# Patient Record
Sex: MALE | Race: WHITE | NOT HISPANIC OR LATINO | Employment: OTHER | URBAN - NONMETROPOLITAN AREA
[De-identification: names, ages, dates, MRNs, and addresses within clinical notes are randomized per-mention and may not be internally consistent; named-entity substitution may affect disease eponyms.]

---

## 2023-04-21 DIAGNOSIS — F41.8 OTHER SPECIFIED ANXIETY DISORDERS: ICD-10-CM

## 2023-04-22 RX ORDER — TRAZODONE HYDROCHLORIDE 50 MG/1
TABLET ORAL
Qty: 30 TABLET | Refills: 2 | Status: SHIPPED | OUTPATIENT
Start: 2023-04-22 | End: 2023-07-24

## 2023-06-16 DIAGNOSIS — F41.8 OTHER SPECIFIED ANXIETY DISORDERS: ICD-10-CM

## 2023-06-16 RX ORDER — SERTRALINE HYDROCHLORIDE 100 MG/1
TABLET, FILM COATED ORAL
Qty: 90 TABLET | Refills: 0 | Status: SHIPPED | OUTPATIENT
Start: 2023-06-16 | End: 2023-09-23

## 2023-07-20 DIAGNOSIS — F41.8 OTHER SPECIFIED ANXIETY DISORDERS: ICD-10-CM

## 2023-07-24 RX ORDER — TRAZODONE HYDROCHLORIDE 50 MG/1
TABLET ORAL
Qty: 30 TABLET | Refills: 0 | Status: SHIPPED | OUTPATIENT
Start: 2023-07-24 | End: 2023-08-24

## 2023-08-10 ENCOUNTER — TELEPHONE (OUTPATIENT)
Dept: PRIMARY CARE | Facility: CLINIC | Age: 73
End: 2023-08-10
Payer: MEDICARE

## 2023-08-23 ENCOUNTER — APPOINTMENT (OUTPATIENT)
Dept: PRIMARY CARE | Facility: CLINIC | Age: 73
End: 2023-08-23
Payer: MEDICARE

## 2023-08-23 DIAGNOSIS — F41.8 OTHER SPECIFIED ANXIETY DISORDERS: ICD-10-CM

## 2023-08-24 RX ORDER — TRAZODONE HYDROCHLORIDE 50 MG/1
50 TABLET ORAL NIGHTLY
Qty: 30 TABLET | Refills: 0 | OUTPATIENT
Start: 2023-08-24

## 2023-08-24 RX ORDER — TRAZODONE HYDROCHLORIDE 50 MG/1
TABLET ORAL
Qty: 30 TABLET | Refills: 0 | Status: SHIPPED | OUTPATIENT
Start: 2023-08-24 | End: 2023-09-23

## 2023-08-29 ENCOUNTER — OFFICE VISIT (OUTPATIENT)
Dept: PRIMARY CARE | Facility: CLINIC | Age: 73
End: 2023-08-29
Payer: MEDICARE

## 2023-08-29 VITALS
WEIGHT: 135 LBS | SYSTOLIC BLOOD PRESSURE: 120 MMHG | DIASTOLIC BLOOD PRESSURE: 72 MMHG | BODY MASS INDEX: 20.46 KG/M2 | HEIGHT: 68 IN | HEART RATE: 84 BPM | OXYGEN SATURATION: 96 % | TEMPERATURE: 97 F

## 2023-08-29 DIAGNOSIS — Z00.00 ROUTINE GENERAL MEDICAL EXAMINATION AT HEALTH CARE FACILITY: Primary | ICD-10-CM

## 2023-08-29 PROCEDURE — G0439 PPPS, SUBSEQ VISIT: HCPCS | Performed by: FAMILY MEDICINE

## 2023-08-29 PROCEDURE — 1170F FXNL STATUS ASSESSED: CPT | Performed by: FAMILY MEDICINE

## 2023-08-29 PROCEDURE — G0008 ADMIN INFLUENZA VIRUS VAC: HCPCS | Performed by: FAMILY MEDICINE

## 2023-08-29 PROCEDURE — 1126F AMNT PAIN NOTED NONE PRSNT: CPT | Performed by: FAMILY MEDICINE

## 2023-08-29 PROCEDURE — 1159F MED LIST DOCD IN RCRD: CPT | Performed by: FAMILY MEDICINE

## 2023-08-29 PROCEDURE — 90662 IIV NO PRSV INCREASED AG IM: CPT | Performed by: FAMILY MEDICINE

## 2023-08-29 RX ORDER — NAPROXEN SODIUM 220 MG/1
1 TABLET, FILM COATED ORAL DAILY
COMMUNITY
Start: 2019-08-23

## 2023-08-29 RX ORDER — AZATHIOPRINE 50 MG/1
50 TABLET ORAL 2 TIMES DAILY
COMMUNITY

## 2023-08-29 RX ORDER — METOPROLOL SUCCINATE 25 MG/1
25 TABLET, EXTENDED RELEASE ORAL DAILY
COMMUNITY
End: 2024-03-14 | Stop reason: SDUPTHER

## 2023-08-29 RX ORDER — ATORVASTATIN CALCIUM 40 MG/1
40 TABLET, FILM COATED ORAL DAILY
COMMUNITY
End: 2024-04-23 | Stop reason: SDUPTHER

## 2023-08-29 RX ORDER — AMLODIPINE BESYLATE 10 MG/1
10 TABLET ORAL DAILY
COMMUNITY
End: 2023-12-18 | Stop reason: SDUPTHER

## 2023-08-29 ASSESSMENT — ENCOUNTER SYMPTOMS
SORE THROAT: 0
ARTHRALGIAS: 0
CONSTIPATION: 0
SLEEP DISTURBANCE: 0
VOMITING: 0
DYSPHORIC MOOD: 0
DIZZINESS: 0
NUMBNESS: 0
LIGHT-HEADEDNESS: 0
EYE ITCHING: 0
DIARRHEA: 0
UNEXPECTED WEIGHT CHANGE: 0
RHINORRHEA: 0
DEPRESSION: 0
LOSS OF SENSATION IN FEET: 0
FEVER: 0
FLANK PAIN: 0
ROS GI COMMENTS: SEE HISTORY
PALPITATIONS: 0
NERVOUS/ANXIOUS: 0
DYSURIA: 0
APPETITE CHANGE: 0
WHEEZING: 0
COUGH: 0
HEMATURIA: 0
NAUSEA: 0
ACTIVITY CHANGE: 0
BLOOD IN STOOL: 0
SINUS PRESSURE: 0
WEAKNESS: 0
ABDOMINAL PAIN: 0
HEADACHES: 0
SHORTNESS OF BREATH: 0
EYE DISCHARGE: 0
OCCASIONAL FEELINGS OF UNSTEADINESS: 0
MYALGIAS: 0
JOINT SWELLING: 0

## 2023-08-29 ASSESSMENT — ACTIVITIES OF DAILY LIVING (ADL)
BATHING: INDEPENDENT
DRESSING: INDEPENDENT
MANAGING_FINANCES: INDEPENDENT
GROCERY_SHOPPING: INDEPENDENT
TAKING_MEDICATION: INDEPENDENT
DOING_HOUSEWORK: INDEPENDENT

## 2023-08-29 ASSESSMENT — PATIENT HEALTH QUESTIONNAIRE - PHQ9
2. FEELING DOWN, DEPRESSED OR HOPELESS: NOT AT ALL
SUM OF ALL RESPONSES TO PHQ9 QUESTIONS 1 AND 2: 0
1. LITTLE INTEREST OR PLEASURE IN DOING THINGS: NOT AT ALL

## 2023-08-29 NOTE — PROGRESS NOTES
"Subjective   Reason for Visit: Eliot Sandoval is an 72 y.o. male here for a Medicare Wellness visit.     Past Medical, Surgical, and Family History reviewed and updated in chart.    Reviewed all medications by prescribing practitioner or clinical pharmacist (such as prescriptions, OTCs, herbal therapies and supplements) and documented in the medical record.    HPIpatient follows with Alan in cardiology and Milton in gi for his chronic pancreatitis     Patient Care Team:  Marichuy Mccullough DO as PCP - General     Review of Systems   Constitutional:  Negative for activity change, appetite change, fever and unexpected weight change.   HENT:  Negative for congestion, ear pain, postnasal drip, rhinorrhea, sinus pressure and sore throat.    Eyes:  Negative for discharge, itching and visual disturbance.   Respiratory:  Negative for cough, shortness of breath and wheezing.    Cardiovascular:  Negative for chest pain, palpitations and leg swelling.   Gastrointestinal:  Negative for abdominal pain, blood in stool, constipation, diarrhea, nausea and vomiting.        See history    Endocrine: Negative for cold intolerance, heat intolerance and polyuria.   Genitourinary:  Negative for dysuria, flank pain and hematuria.   Musculoskeletal:  Negative for arthralgias, gait problem, joint swelling and myalgias.   Skin:  Negative for rash.   Allergic/Immunologic: Negative for environmental allergies and food allergies.   Neurological:  Negative for dizziness, syncope, weakness, light-headedness, numbness and headaches.   Psychiatric/Behavioral:  Negative for dysphoric mood and sleep disturbance. The patient is not nervous/anxious.        Objective   Vitals:  /72   Pulse 84   Temp 36.1 °C (97 °F)   Ht 1.727 m (5' 8\")   Wt 61.2 kg (135 lb)   SpO2 96%   BMI 20.53 kg/m²       Physical Exam  Vitals and nursing note reviewed.   Constitutional:       Appearance: Normal appearance.   HENT:      Head: Normocephalic.      " Mouth/Throat:      Mouth: Mucous membranes are moist.   Cardiovascular:      Rate and Rhythm: Normal rate and regular rhythm.      Pulses: Normal pulses.      Heart sounds: Normal heart sounds. No murmur heard.     No friction rub. No gallop.   Pulmonary:      Effort: Pulmonary effort is normal. No respiratory distress.      Breath sounds: Normal breath sounds. No wheezing.   Abdominal:      General: Bowel sounds are normal. There is no distension.      Palpations: Abdomen is soft.      Tenderness: There is no abdominal tenderness.   Musculoskeletal:         General: No deformity. Normal range of motion.   Skin:     General: Skin is warm and dry.      Capillary Refill: Capillary refill takes less than 2 seconds.   Neurological:      General: No focal deficit present.      Mental Status: He is alert and oriented to person, place, and time.   Psychiatric:         Mood and Affect: Mood normal.         Assessment/Plan   Problem List Items Addressed This Visit    None  Visit Diagnoses       Routine general medical examination at health care facility    -  Primary

## 2023-09-21 DIAGNOSIS — F41.8 OTHER SPECIFIED ANXIETY DISORDERS: ICD-10-CM

## 2023-09-23 RX ORDER — SERTRALINE HYDROCHLORIDE 100 MG/1
TABLET, FILM COATED ORAL
Qty: 90 TABLET | Refills: 1 | Status: SHIPPED | OUTPATIENT
Start: 2023-09-23 | End: 2024-03-22

## 2023-09-23 RX ORDER — TRAZODONE HYDROCHLORIDE 50 MG/1
TABLET ORAL
Qty: 30 TABLET | Refills: 5 | Status: SHIPPED | OUTPATIENT
Start: 2023-09-23 | End: 2024-03-22

## 2023-12-18 DIAGNOSIS — I10 PRIMARY HYPERTENSION: Primary | ICD-10-CM

## 2023-12-19 RX ORDER — AMLODIPINE BESYLATE 10 MG/1
10 TABLET ORAL DAILY
Qty: 30 TABLET | Refills: 0 | Status: SHIPPED | OUTPATIENT
Start: 2023-12-19 | End: 2024-03-18

## 2024-03-14 DIAGNOSIS — I10 PRIMARY HYPERTENSION: Primary | ICD-10-CM

## 2024-03-15 RX ORDER — METOPROLOL SUCCINATE 25 MG/1
25 TABLET, EXTENDED RELEASE ORAL DAILY
Qty: 30 TABLET | Refills: 2 | Status: SHIPPED | OUTPATIENT
Start: 2024-03-15 | End: 2024-04-23 | Stop reason: SDUPTHER

## 2024-03-18 DIAGNOSIS — I10 PRIMARY HYPERTENSION: ICD-10-CM

## 2024-03-18 RX ORDER — AMLODIPINE BESYLATE 10 MG/1
10 TABLET ORAL DAILY
Qty: 30 TABLET | Refills: 0 | Status: SHIPPED | OUTPATIENT
Start: 2024-03-18 | End: 2024-04-23 | Stop reason: SDUPTHER

## 2024-03-21 DIAGNOSIS — F41.8 OTHER SPECIFIED ANXIETY DISORDERS: ICD-10-CM

## 2024-03-22 RX ORDER — TRAZODONE HYDROCHLORIDE 50 MG/1
TABLET ORAL
Qty: 30 TABLET | Refills: 1 | Status: SHIPPED | OUTPATIENT
Start: 2024-03-22 | End: 2024-05-22

## 2024-03-22 RX ORDER — SERTRALINE HYDROCHLORIDE 100 MG/1
TABLET, FILM COATED ORAL
Qty: 90 TABLET | Refills: 1 | Status: SHIPPED | OUTPATIENT
Start: 2024-03-22

## 2024-04-23 ENCOUNTER — OFFICE VISIT (OUTPATIENT)
Dept: CARDIOLOGY | Facility: CLINIC | Age: 74
End: 2024-04-23
Payer: MEDICARE

## 2024-04-23 VITALS
WEIGHT: 138 LBS | SYSTOLIC BLOOD PRESSURE: 164 MMHG | OXYGEN SATURATION: 97 % | DIASTOLIC BLOOD PRESSURE: 83 MMHG | HEIGHT: 69 IN | HEART RATE: 75 BPM | BODY MASS INDEX: 20.44 KG/M2

## 2024-04-23 DIAGNOSIS — I73.9 INTERMITTENT CLAUDICATION (CMS-HCC): ICD-10-CM

## 2024-04-23 DIAGNOSIS — I10 PRIMARY HYPERTENSION: ICD-10-CM

## 2024-04-23 DIAGNOSIS — E78.2 MIXED HYPERLIPIDEMIA: Primary | ICD-10-CM

## 2024-04-23 DIAGNOSIS — I73.9 PAD (PERIPHERAL ARTERY DISEASE) (CMS-HCC): ICD-10-CM

## 2024-04-23 PROCEDURE — 1159F MED LIST DOCD IN RCRD: CPT | Performed by: NURSE PRACTITIONER

## 2024-04-23 PROCEDURE — 3077F SYST BP >= 140 MM HG: CPT | Performed by: NURSE PRACTITIONER

## 2024-04-23 PROCEDURE — 3079F DIAST BP 80-89 MM HG: CPT | Performed by: NURSE PRACTITIONER

## 2024-04-23 PROCEDURE — 1160F RVW MEDS BY RX/DR IN RCRD: CPT | Performed by: NURSE PRACTITIONER

## 2024-04-23 PROCEDURE — 99213 OFFICE O/P EST LOW 20 MIN: CPT | Performed by: NURSE PRACTITIONER

## 2024-04-23 RX ORDER — LOSARTAN POTASSIUM AND HYDROCHLOROTHIAZIDE 12.5; 5 MG/1; MG/1
TABLET ORAL
COMMUNITY
Start: 2015-12-27 | End: 2024-04-23 | Stop reason: ALTCHOICE

## 2024-04-23 RX ORDER — ATORVASTATIN CALCIUM 40 MG/1
40 TABLET, FILM COATED ORAL DAILY
Qty: 90 TABLET | Refills: 3 | Status: SHIPPED | OUTPATIENT
Start: 2024-04-23 | End: 2025-04-23

## 2024-04-23 RX ORDER — AMLODIPINE BESYLATE 10 MG/1
10 TABLET ORAL DAILY
Qty: 90 TABLET | Refills: 3 | Status: SHIPPED | OUTPATIENT
Start: 2024-04-23 | End: 2025-04-23

## 2024-04-23 RX ORDER — METOPROLOL SUCCINATE 25 MG/1
25 TABLET, EXTENDED RELEASE ORAL DAILY
Qty: 90 TABLET | Refills: 3 | Status: SHIPPED | OUTPATIENT
Start: 2024-04-23 | End: 2025-04-23

## 2024-04-23 NOTE — PATIENT INSTRUCTIONS
We will follow up in a year   When you are ready to see vascular call me and we can get it arranged

## 2024-04-23 NOTE — PROGRESS NOTES
Primary Care Physician: Marichuy Mccullough DO  Primary Cardiologist:       Date of Visit: 04/23/2024  9:00 AM EDT  Location of visit:  [facility]   Type of Visit: Follow up             Chief Complaint   Patient presents with    Follow-up     Here for 1 year follow up  been having night sweats        HPI / Summary:   Eliot Sandoval is a 73 y.o. male  active smoker with HTN, HLD, PAD, COPD and depression returns for annual follow up    Home BP 140s/70-80 mmhg   He is grieving the death of his wife of 55  years   He has intermittent claudication after 150 steps     12 system review is negative except as noted above         Medical History:   Past Medical History:   Diagnosis Date    Chronic obstructive pulmonary disease with (acute) lower respiratory infection (Multi) 01/14/2020    Acute bronchitis with COPD    Personal history of diseases of the skin and subcutaneous tissue 08/30/2019    History of skin pruritus    Personal history of other specified conditions 11/22/2019    History of urinary frequency    Personal history of other specified conditions 12/30/2019    History of jaundice    Unspecified cataract 05/14/2018    Cataracts, bilateral       Social History:   Tobacco Use: High Risk (4/23/2024)    Patient History     Smoking Tobacco Use: Every Day     Smokeless Tobacco Use: Never     Passive Exposure: Not on file         MEDICATIONS:   Current Outpatient Medications   Medication Instructions    amLODIPine (NORVASC) 10 mg, oral, Daily    aspirin 81 mg chewable tablet 1 tablet, oral, Daily    atorvastatin (LIPITOR) 40 mg, oral, Daily    azaTHIOprine (IMURAN) 50 mg, oral, 2 times daily    metoprolol succinate XL (TOPROL-XL) 25 mg, oral, Daily    sertraline (Zoloft) 100 mg tablet TAKE 1 TABLET BY MOUTH DAILY    traZODone (Desyrel) 50 mg tablet TAKE 1 TABLET BY MOUTH AT BEDTIME         IMAGING REVIEWED:      Echocardiogram 8/20/2019       CONCLUSIONS:  1. The left ventricular systolic function is normal with a  "60% estimated ejection fraction.            NM CARDIAC STRESS REST (MYOCARDIAL PERFUSION MIBI) 09/23/2019  Impression  1.  Normal stress myocardial perfusion imaging in response to  pharmacologic stress without evidence of ischemia or infarction.  2. Normal left ventricular size.  3. Normal ejection fraction of 61 %.      LABS:  CBC with Differential:    Lab Results   Component Value Date    WBC 4.4 05/07/2021    RBC 4.46 (L) 05/07/2021    HGB 15.1 05/07/2021    HCT 46.8 05/07/2021     05/07/2021     (H) 05/07/2021    MCHC 32.3 05/07/2021    RDW 13.5 05/07/2021    NRBC 0.0 10/23/2019    LYMPHOPCT 16.9 10/23/2019    MONOPCT 10.8 10/23/2019    EOSPCT 3.9 10/23/2019    BASOPCT 1.1 10/23/2019    MONOSABS 1.09 (H) 10/23/2019    LYMPHSABS 1.70 10/23/2019    EOSABS 0.39 10/23/2019    BASOSABS 0.11 (H) 10/23/2019     CMP:    Lab Results   Component Value Date     08/12/2022    K 4.4 08/12/2022     08/12/2022    CO2 29 08/12/2022    BUN 15 08/12/2022    CREATININE 0.78 08/12/2022    GLUCOSE 106 (H) 08/12/2022    PROT 6.9 08/12/2022    CALCIUM 9.3 08/12/2022    BILITOT 0.6 08/12/2022    ALKPHOS 74 08/12/2022    AST 16 08/12/2022    ALT 10 08/12/2022     BMP:    Lab Results   Component Value Date     08/12/2022    K 4.4 08/12/2022     08/12/2022    CO2 29 08/12/2022    BUN 15 08/12/2022    CREATININE 0.78 08/12/2022    CALCIUM 9.3 08/12/2022    GLUCOSE 106 (H) 08/12/2022     Magnesium:  Lab Results   Component Value Date    MG 1.90 10/08/2019     Troponin:  No results found for: \"TROPHS\"  BNP: No results found for: \"BNP\"      Lipid Panel:  Lab Results   Component Value Date    HDL 58.6 08/12/2022    CHHDL 2.3 08/12/2022    VLDL 13 08/12/2022    TRIG 63 08/12/2022        Lab work and imaging results independently reviewed by me     Visit Vitals  /83   Pulse 75   Ht 1.753 m (5' 9\")   Wt 62.6 kg (138 lb)   SpO2 97%   BMI 20.38 kg/m²   Smoking Status Every Day   BSA 1.75 m² "         Constitutional:       Appearance: Healthy appearance. Not in distress.   Eyes:      Conjunctiva/sclera: Conjunctivae normal.   Neck:      Vascular: JVD normal.   Pulmonary:      Effort: Pulmonary effort is normal.      Breath sounds: Normal breath sounds.   Cardiovascular:      PMI at left midclavicular line. Normal rate. Regular rhythm. Normal S1. Normal S2.       Murmurs: There is no murmur.      No rub.   Pulses:     Intact distal pulses.   Edema:     Peripheral edema absent.   Abdominal:      General: Bowel sounds are normal.   Musculoskeletal:      Cervical back: Neck supple. Skin:     General: Skin is warm and dry.   Neurological:      Mental Status: Alert and oriented to person, place and time.           Problem List Items Addressed This Visit             ICD-10-CM    Primary hypertension I10    Relevant Medications    amLODIPine (Norvasc) 10 mg tablet    metoprolol succinate XL (Toprol-XL) 25 mg 24 hr tablet    Intermittent claudication (CMS-HCC) I73.9    Mixed hyperlipidemia - Primary E78.2    Relevant Medications    atorvastatin (Lipitor) 40 mg tablet    PAD (peripheral artery disease) (CMS-HCC) I73.9     Mr. Ozuna is doing well from a cardiac perspective without angina or symptoms suggestive of decompensated HF     BP is in acceptable range on current RX which He is tolerating well and agrees to continue   -- Amlodipine 10 mg   --Toprol Xl 25 mg    Most recent LDL 63 mg/dL   -- Atorvastatin 40 mg HS     Mediterranean / DASH diet   150 minutes of symptom limited exercise / week   He would like to defer vascular evaluation   We discussed the importance of complete smoking cessation        04/23/24 at 12:41 PM - CELINA Fischer-CNP      Orders:  No orders of the defined types were placed in this encounter.        Followup Appts:  No future appointments.

## 2024-04-23 NOTE — LETTER
April 23, 2024     Marichuy Mccullough DO  [address] [facility] [address]    Patient: Eliot Sandoval   YOB: 1950   Date of Visit: 4/23/2024       Dear Dr. Marichuy Mccullough DO:    Thank you for referring Eliot Sandoval to me for evaluation. Below are my notes for this consultation.  If you have questions, please do not hesitate to call me. I look forward to following your patient along with you.       Sincerely,     Dang Phillips, APRN-CNP      CC: No Recipients  ______________________________________________________________________________________    Primary Care Physician: Marichuy Mccullough DO  Primary Cardiologist:       Date of Visit: 04/23/2024  9:00 AM EDT  Location of visit:  [address]   Type of Visit: Follow up             Chief Complaint   Patient presents with   • Follow-up     Here for 1 year follow up  been having night sweats        HPI / Summary:   Eliot Sandoval is a 73 y.o. male  active smoker with HTN, HLD, PAD, COPD and depression returns for annual follow up    Home BP 140s/70-80 mmhg   He is grieving the death of his wife of 55  years   He has intermittent claudication after 150 steps     12 system review is negative except as noted above         Medical History:   Past Medical History:   Diagnosis Date   • Chronic obstructive pulmonary disease with (acute) lower respiratory infection (Multi) 01/14/2020    Acute bronchitis with COPD   • Personal history of diseases of the skin and subcutaneous tissue 08/30/2019    History of skin pruritus   • Personal history of other specified conditions 11/22/2019    History of urinary frequency   • Personal history of other specified conditions 12/30/2019    History of jaundice   • Unspecified cataract 05/14/2018    Cataracts, bilateral       Social History:   Tobacco Use: High Risk (4/23/2024)    Patient History    • Smoking Tobacco Use: Every Day    • Smokeless Tobacco Use: Never    • Passive Exposure: Not on file          MEDICATIONS:   Current Outpatient Medications   Medication Instructions   • amLODIPine (NORVASC) 10 mg, oral, Daily   • aspirin 81 mg chewable tablet 1 tablet, oral, Daily   • atorvastatin (LIPITOR) 40 mg, oral, Daily   • azaTHIOprine (IMURAN) 50 mg, oral, 2 times daily   • metoprolol succinate XL (TOPROL-XL) 25 mg, oral, Daily   • sertraline (Zoloft) 100 mg tablet TAKE 1 TABLET BY MOUTH DAILY   • traZODone (Desyrel) 50 mg tablet TAKE 1 TABLET BY MOUTH AT BEDTIME         IMAGING REVIEWED:      Echocardiogram 8/20/2019       CONCLUSIONS:  1. The left ventricular systolic function is normal with a 60% estimated ejection fraction.            NM CARDIAC STRESS REST (MYOCARDIAL PERFUSION MIBI) 09/23/2019  Impression  1.  Normal stress myocardial perfusion imaging in response to  pharmacologic stress without evidence of ischemia or infarction.  2. Normal left ventricular size.  3. Normal ejection fraction of 61 %.      LABS:  CBC with Differential:    Lab Results   Component Value Date    WBC 4.4 05/07/2021    RBC 4.46 (L) 05/07/2021    HGB 15.1 05/07/2021    HCT 46.8 05/07/2021     05/07/2021     (H) 05/07/2021    MCHC 32.3 05/07/2021    RDW 13.5 05/07/2021    NRBC 0.0 10/23/2019    LYMPHOPCT 16.9 10/23/2019    MONOPCT 10.8 10/23/2019    EOSPCT 3.9 10/23/2019    BASOPCT 1.1 10/23/2019    MONOSABS 1.09 (H) 10/23/2019    LYMPHSABS 1.70 10/23/2019    EOSABS 0.39 10/23/2019    BASOSABS 0.11 (H) 10/23/2019     CMP:    Lab Results   Component Value Date     08/12/2022    K 4.4 08/12/2022     08/12/2022    CO2 29 08/12/2022    BUN 15 08/12/2022    CREATININE 0.78 08/12/2022    GLUCOSE 106 (H) 08/12/2022    PROT 6.9 08/12/2022    CALCIUM 9.3 08/12/2022    BILITOT 0.6 08/12/2022    ALKPHOS 74 08/12/2022    AST 16 08/12/2022    ALT 10 08/12/2022     BMP:    Lab Results   Component Value Date     08/12/2022    K 4.4 08/12/2022     08/12/2022    CO2 29 08/12/2022    BUN 15 08/12/2022     "CREATININE 0.78 08/12/2022    CALCIUM 9.3 08/12/2022    GLUCOSE 106 (H) 08/12/2022     Magnesium:  Lab Results   Component Value Date    MG 1.90 10/08/2019     Troponin:  No results found for: \"TROPHS\"  BNP: No results found for: \"BNP\"      Lipid Panel:  Lab Results   Component Value Date    HDL 58.6 08/12/2022    CHHDL 2.3 08/12/2022    VLDL 13 08/12/2022    TRIG 63 08/12/2022        Lab work and imaging results independently reviewed by me     Visit Vitals  /83   Pulse 75   Ht 1.753 m (5' 9\")   Wt 62.6 kg (138 lb)   SpO2 97%   BMI 20.38 kg/m²   Smoking Status Every Day   BSA 1.75 m²         Constitutional:       Appearance: Healthy appearance. Not in distress.   Eyes:      Conjunctiva/sclera: Conjunctivae normal.   Neck:      Vascular: JVD normal.   Pulmonary:      Effort: Pulmonary effort is normal.      Breath sounds: Normal breath sounds.   Cardiovascular:      PMI at left midclavicular line. Normal rate. Regular rhythm. Normal S1. Normal S2.       Murmurs: There is no murmur.      No rub.   Pulses:     Intact distal pulses.   Edema:     Peripheral edema absent.   Abdominal:      General: Bowel sounds are normal.   Musculoskeletal:      Cervical back: Neck supple. Skin:     General: Skin is warm and dry.   Neurological:      Mental Status: Alert and oriented to person, place and time.           Problem List Items Addressed This Visit             ICD-10-CM    Primary hypertension I10    Relevant Medications    amLODIPine (Norvasc) 10 mg tablet    metoprolol succinate XL (Toprol-XL) 25 mg 24 hr tablet    Intermittent claudication (CMS-[facility]) I73.9    Mixed hyperlipidemia - Primary E78.2    Relevant Medications    atorvastatin (Lipitor) 40 mg tablet    PAD (peripheral artery disease) (CMS-[facility]) I73.9     Mr. Ozuna is doing well from a cardiac perspective without angina or symptoms suggestive of decompensated HF     BP is in acceptable range on current RX which He is tolerating well and agrees to continue "   -- Amlodipine 10 mg   --Toprol Xl 25 mg    Most recent LDL 63 mg/dL   -- Atorvastatin 40 mg HS     Mediterranean / DASH diet   150 minutes of symptom limited exercise / week   He would like to defer vascular evaluation   We discussed the importance of complete smoking cessation        04/23/24 at 12:41 PM - CELINA Fischer-CNP      Orders:  No orders of the defined types were placed in this encounter.        Followup Appts:  No future appointments.

## 2024-05-21 DIAGNOSIS — F41.8 OTHER SPECIFIED ANXIETY DISORDERS: ICD-10-CM

## 2024-05-22 RX ORDER — TRAZODONE HYDROCHLORIDE 50 MG/1
TABLET ORAL
Qty: 30 TABLET | Refills: 1 | Status: SHIPPED | OUTPATIENT
Start: 2024-05-22

## 2024-07-18 DIAGNOSIS — F41.8 OTHER SPECIFIED ANXIETY DISORDERS: ICD-10-CM

## 2024-07-19 RX ORDER — TRAZODONE HYDROCHLORIDE 50 MG/1
TABLET ORAL
Qty: 30 TABLET | Refills: 1 | Status: SHIPPED | OUTPATIENT
Start: 2024-07-19

## 2024-09-04 DIAGNOSIS — F41.8 OTHER SPECIFIED ANXIETY DISORDERS: ICD-10-CM

## 2024-09-10 RX ORDER — SERTRALINE HYDROCHLORIDE 100 MG/1
100 TABLET, FILM COATED ORAL DAILY
Qty: 90 TABLET | Refills: 0 | Status: SHIPPED | OUTPATIENT
Start: 2024-09-10

## 2024-09-10 RX ORDER — TRAZODONE HYDROCHLORIDE 50 MG/1
TABLET ORAL
Qty: 30 TABLET | Refills: 0 | Status: SHIPPED | OUTPATIENT
Start: 2024-09-10

## 2024-10-04 ENCOUNTER — APPOINTMENT (OUTPATIENT)
Dept: PRIMARY CARE | Facility: CLINIC | Age: 74
End: 2024-10-04
Payer: MEDICARE

## 2024-10-04 VITALS
HEART RATE: 96 BPM | BODY MASS INDEX: 21.5 KG/M2 | HEIGHT: 67 IN | SYSTOLIC BLOOD PRESSURE: 148 MMHG | OXYGEN SATURATION: 98 % | DIASTOLIC BLOOD PRESSURE: 82 MMHG | WEIGHT: 137 LBS | TEMPERATURE: 97.7 F

## 2024-10-04 DIAGNOSIS — Z00.00 ROUTINE GENERAL MEDICAL EXAMINATION AT HEALTH CARE FACILITY: Primary | ICD-10-CM

## 2024-10-04 ASSESSMENT — ENCOUNTER SYMPTOMS
FEVER: 0
SHORTNESS OF BREATH: 0
DIARRHEA: 0
BLOOD IN STOOL: 0
FLANK PAIN: 0
RHINORRHEA: 0
PALPITATIONS: 0
WHEEZING: 0
SLEEP DISTURBANCE: 0
LIGHT-HEADEDNESS: 0
JOINT SWELLING: 0
DEPRESSION: 0
EYE DISCHARGE: 0
MYALGIAS: 0
SORE THROAT: 0
EYE ITCHING: 0
APPETITE CHANGE: 0
DYSPHORIC MOOD: 0
OCCASIONAL FEELINGS OF UNSTEADINESS: 0
SINUS PRESSURE: 0
NUMBNESS: 0
NERVOUS/ANXIOUS: 0
WEAKNESS: 0
DIZZINESS: 0
COUGH: 0
LOSS OF SENSATION IN FEET: 0
VOMITING: 0
HEADACHES: 0
UNEXPECTED WEIGHT CHANGE: 0
ACTIVITY CHANGE: 0
CONSTIPATION: 0
ABDOMINAL PAIN: 0
HEMATURIA: 0
ARTHRALGIAS: 0
NAUSEA: 0
DYSURIA: 0

## 2024-10-04 ASSESSMENT — ACTIVITIES OF DAILY LIVING (ADL)
TAKING_MEDICATION: INDEPENDENT
DRESSING: INDEPENDENT
BATHING: INDEPENDENT
GROCERY_SHOPPING: INDEPENDENT
MANAGING_FINANCES: INDEPENDENT
DOING_HOUSEWORK: INDEPENDENT

## 2024-10-04 NOTE — PROGRESS NOTES
"Subjective   Reason for Visit: Eliot Sandoval is an 74 y.o. male here for a Medicare Wellness visit.     Past Medical, Surgical, and Family History reviewed and updated in chart.    Reviewed all medications by prescribing practitioner or clinical pharmacist (such as prescriptions, OTCs, herbal therapies and supplements) and documented in the medical record.    HPISEEING GI  (AUTOIMMUNE PANCREAS /LIVER ISSUES ) AND CARDIOLOGY     Patient Care Team:  Marichuy Mccullough DO as PCP - General     Review of Systems   Constitutional:  Negative for activity change, appetite change, fever and unexpected weight change.   HENT:  Negative for congestion, ear pain, postnasal drip, rhinorrhea, sinus pressure and sore throat.    Eyes:  Negative for discharge, itching and visual disturbance.   Respiratory:  Negative for cough, shortness of breath and wheezing.    Cardiovascular:  Negative for chest pain, palpitations and leg swelling.   Gastrointestinal:  Negative for abdominal pain, blood in stool, constipation, diarrhea, nausea and vomiting.   Endocrine: Negative for cold intolerance, heat intolerance and polyuria.   Genitourinary:  Negative for dysuria, flank pain and hematuria.   Musculoskeletal:  Negative for arthralgias, gait problem, joint swelling and myalgias.   Skin:  Negative for rash.   Allergic/Immunologic: Negative for environmental allergies and food allergies.   Neurological:  Negative for dizziness, syncope, weakness, light-headedness, numbness and headaches.   Psychiatric/Behavioral:  Negative for dysphoric mood and sleep disturbance. The patient is not nervous/anxious.        Objective   Vitals:  /82   Pulse 96   Temp 36.5 °C (97.7 °F)   Ht 1.689 m (5' 6.5\")   Wt 62.1 kg (137 lb)   SpO2 98%   BMI 21.78 kg/m²       Physical Exam  Vitals and nursing note reviewed.   Constitutional:       Appearance: Normal appearance.   HENT:      Head: Normocephalic.   Cardiovascular:      Rate and Rhythm: Normal " rate and regular rhythm.      Pulses: Normal pulses.      Heart sounds: Normal heart sounds. No murmur heard.     No friction rub. No gallop.   Pulmonary:      Effort: Pulmonary effort is normal. No respiratory distress.      Breath sounds: Normal breath sounds. No wheezing.   Abdominal:      General: There is no distension.      Tenderness: There is no abdominal tenderness.   Musculoskeletal:         General: No deformity. Normal range of motion.   Skin:     General: Skin is warm and dry.      Capillary Refill: Capillary refill takes less than 2 seconds.   Neurological:      General: No focal deficit present.      Mental Status: He is alert and oriented to person, place, and time.   Psychiatric:         Mood and Affect: Mood normal.         Assessment & Plan  Routine general medical examination at health care facility    Orders:    1 Year Follow Up In Primary Care - Wellness Exam; Future

## 2024-10-17 DIAGNOSIS — F41.8 OTHER SPECIFIED ANXIETY DISORDERS: ICD-10-CM

## 2024-10-22 RX ORDER — TRAZODONE HYDROCHLORIDE 50 MG/1
TABLET ORAL
Qty: 30 TABLET | Refills: 5 | Status: SHIPPED | OUTPATIENT
Start: 2024-10-22

## 2025-03-31 DIAGNOSIS — E78.2 MIXED HYPERLIPIDEMIA: ICD-10-CM

## 2025-03-31 DIAGNOSIS — I10 PRIMARY HYPERTENSION: ICD-10-CM

## 2025-03-31 RX ORDER — AMLODIPINE BESYLATE 10 MG/1
10 TABLET ORAL DAILY
Qty: 90 TABLET | Refills: 3 | Status: SHIPPED | OUTPATIENT
Start: 2025-03-31 | End: 2026-03-31

## 2025-03-31 RX ORDER — ATORVASTATIN CALCIUM 40 MG/1
40 TABLET, FILM COATED ORAL DAILY
Qty: 90 TABLET | Refills: 3 | Status: SHIPPED | OUTPATIENT
Start: 2025-03-31 | End: 2026-03-31

## 2025-04-21 DIAGNOSIS — F41.8 OTHER SPECIFIED ANXIETY DISORDERS: ICD-10-CM

## 2025-04-22 RX ORDER — TRAZODONE HYDROCHLORIDE 50 MG/1
50 TABLET ORAL NIGHTLY
Qty: 30 TABLET | Refills: 5 | Status: SHIPPED | OUTPATIENT
Start: 2025-04-22

## 2025-06-05 DIAGNOSIS — I10 PRIMARY HYPERTENSION: ICD-10-CM

## 2025-06-06 RX ORDER — METOPROLOL SUCCINATE 25 MG/1
25 TABLET, EXTENDED RELEASE ORAL DAILY
Qty: 90 TABLET | Refills: 0 | Status: SHIPPED | OUTPATIENT
Start: 2025-06-06 | End: 2026-06-06

## 2025-07-22 NOTE — PROGRESS NOTES
"Primary Care Physician: Marichuy Mccullough DO    Date of Visit: 07/23/2025 10:20 AM EDT  Location of visit:  [facility]   Type of Visit: Follow up          Chief Complaint   Patient presents with    Follow-up     No concerns       HPI / Summary:   Eliot Sandoval is a 74 y.o. male  with PMHx: HTN, HLD, PAD, COPD and depression who returns for routine follow up.     Overall doing well from CV perspective. Notes he is \"starting to feel his age\". 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 Notices this year has increased soreness. Usually in the AM and then works its self out by mid day.   History of intermittent claudication - he monitors it and at this time is not interested in seeing anyone from vascular.    12 system review is negative except as noted above    Medical History:   Medical History[1]    Social History:   Tobacco Use: High Risk (7/23/2025)    Patient History     Smoking Tobacco Use: Every Day     Smokeless Tobacco Use: Never     Passive Exposure: Not on file       MEDICATIONS:   Current Outpatient Medications   Medication Instructions    acyclovir (ZOVIRAX) 400 mg, 2 times daily    amLODIPine (NORVASC) 10 mg, oral, Daily    aspirin 81 mg chewable tablet 1 tablet, oral, Daily    atorvastatin (LIPITOR) 40 mg, oral, Daily    azaTHIOprine (IMURAN) 50 mg, oral, 2 times daily    metoprolol succinate XL (TOPROL-XL) 25 mg, oral, Daily    traZODone (DESYREL) 50 mg, oral, Nightly       IMAGING REPORTS INDEPENDENTLY REVIEWED:   Echocardiogram 8/20/2019        CONCLUSIONS:  1. The left ventricular systolic function is normal with a 60% estimated ejection fraction.     NM CARDIAC STRESS REST (MYOCARDIAL PERFUSION MIBI) 09/23/2019  Impression  1.  Normal stress myocardial perfusion imaging in response to  pharmacologic stress without evidence of ischemia or infarction.  2. Normal left ventricular size.  3. Normal ejection fraction of 61 %.       LABS:    Lab review from OSH available in the " "labs tab of chart from [facility]. I have personally reviewed the laboratory result(s) CBC, CMP, liver enzymes, lipid panel, HS troponin, BNP, magnesium, TSH, Coag, Hgb A1C,  and interpreted independently and my findings are All abnormal values addressed in the plan section of note.     CBC with Differential:    Lab Results   Component Value Date    WBC 4.4 05/07/2021    RBC 4.46 (L) 05/07/2021    HGB 15.1 05/07/2021    HCT 46.8 05/07/2021     05/07/2021     (H) 05/07/2021    MCHC 32.3 05/07/2021    RDW 13.5 05/07/2021    NRBC 0.0 10/23/2019    LYMPHOPCT 16.9 10/23/2019    MONOPCT 10.8 10/23/2019    EOSPCT 3.9 10/23/2019    BASOPCT 1.1 10/23/2019    MONOSABS 1.09 (H) 10/23/2019    LYMPHSABS 1.70 10/23/2019    EOSABS 0.39 10/23/2019    BASOSABS 0.11 (H) 10/23/2019     CMP:    Lab Results   Component Value Date     08/12/2022    K 4.4 08/12/2022     08/12/2022    CO2 29 08/12/2022    BUN 15 08/12/2022    CREATININE 0.78 08/12/2022    GLUCOSE 106 (H) 08/12/2022    PROT 6.9 08/12/2022    CALCIUM 9.3 08/12/2022    BILITOT 0.6 08/12/2022    ALKPHOS 74 08/12/2022    AST 16 08/12/2022    ALT 10 08/12/2022     BMP:    Lab Results   Component Value Date     08/12/2022    K 4.4 08/12/2022     08/12/2022    CO2 29 08/12/2022    BUN 15 08/12/2022    CREATININE 0.78 08/12/2022    CALCIUM 9.3 08/12/2022    GLUCOSE 106 (H) 08/12/2022     Magnesium:  Lab Results   Component Value Date    MG 1.90 10/08/2019     Troponin:  No results found for: \"TROPHS\"  BNP: No results found for: \"BNP\"      Lipid Panel:  Lab Results   Component Value Date    HDL 58.6 08/12/2022    CHHDL 2.3 08/12/2022    VLDL 13 08/12/2022    TRIG 63 08/12/2022        Lab work and imaging results independently reviewed by me     Visit Vitals  /84   Pulse 92   Ht 1.689 m (5' 6.5\")   Wt 60.6 kg (133 lb 9.6 oz)   SpO2 97%   BMI 21.24 kg/m²   Smoking Status Every Day   BSA 1.69 m²       Vitals reviewed.   Constitutional:       " General: Awake.      Appearance: Normal appearance. Well-developed and not in distress.   Eyes:      General: Lids are normal.      Pupils: Pupils are equal, round, and reactive to light.   HENT:      Right Ear: External ear normal.      Left Ear: External ear normal.      Nose: Nose normal.    Mouth/Throat:      Lips: Pink.      Mouth: Mucous membranes are moist.   Pulmonary:      Effort: Pulmonary effort is normal.      Breath sounds: Normal air entry.   Cardiovascular:      PMI at left midclavicular line. Normal rate. Regular rhythm. Normal S1. Normal S2.       Murmurs: There is no murmur.   Edema:     Peripheral edema absent.   Abdominal:      General: Bowel sounds are normal.      Palpations: Abdomen is soft.      Tenderness: There is no abdominal tenderness.   Musculoskeletal:      Cervical back: Full passive range of motion without pain, normal range of motion and neck supple. Skin:     General: Skin is warm and dry.   Neurological:      General: No focal deficit present.      Mental Status: Alert, oriented to person, place, and time and oriented to person, place and time. Mental status is at baseline.   Psychiatric:         Attention and Perception: Attention normal.         Mood and Affect: Mood normal.         Speech: Speech normal.         Behavior: Behavior is cooperative.         Problem List Items Addressed This Visit           ICD-10-CM    Primary hypertension - Primary I10    Relevant Orders    Follow Up In Cardiology    Intermittent claudication I73.9    Relevant Orders    Follow Up In Cardiology    Mixed hyperlipidemia E78.2    Relevant Orders    Follow Up In Cardiology    PAD (peripheral artery disease) I73.9    Relevant Orders    Follow Up In Cardiology     Acyclovir per GI for shingles.     Mr. Ozuna is doing well from a cardiac perspective without angina or symptoms suggestive of decompensated HF      BP is in acceptable range on current RX which He is tolerating well and agrees to continue    -- Amlodipine 10 mg   --Toprol Xl 25 mg     Most recent LDL 63 mg/dL   -- Atorvastatin 40 mg HS      Mediterranean / DASH diet   150 minutes of symptom limited exercise / week     Intermittent Claudication   --He would like to defer vascular evaluation     current smoker  We discussed the importance of complete smoking cessation for 5 minutes.  He    declines assistance at this time and not interested in quitting.     07/24/25 at 3:23 PM - JENNYFER Tillman      Orders:  No orders of the defined types were placed in this encounter.        Followup Appts:  Future Appointments   Date Time Provider Department Center   7/15/2026  8:20 AM JENNYFER Tillman [id number]            [1]   Past Medical History:  Diagnosis Date    Chronic obstructive pulmonary disease with (acute) lower respiratory infection 01/14/2020    Acute bronchitis with COPD    Personal history of diseases of the skin and subcutaneous tissue 08/30/2019    History of skin pruritus    Personal history of other specified conditions 11/22/2019    History of urinary frequency    Personal history of other specified conditions 12/30/2019    History of jaundice    Unspecified cataract 05/14/2018    Cataracts, bilateral

## 2025-07-23 ENCOUNTER — APPOINTMENT (OUTPATIENT)
Dept: CARDIOLOGY | Facility: CLINIC | Age: 75
End: 2025-07-23
Payer: MEDICARE

## 2025-07-23 VITALS
HEART RATE: 92 BPM | WEIGHT: 133.6 LBS | SYSTOLIC BLOOD PRESSURE: 147 MMHG | OXYGEN SATURATION: 97 % | BODY MASS INDEX: 20.97 KG/M2 | DIASTOLIC BLOOD PRESSURE: 84 MMHG | HEIGHT: 67 IN

## 2025-07-23 DIAGNOSIS — I73.9 PAD (PERIPHERAL ARTERY DISEASE): ICD-10-CM

## 2025-07-23 DIAGNOSIS — I73.9 INTERMITTENT CLAUDICATION: ICD-10-CM

## 2025-07-23 DIAGNOSIS — E78.2 MIXED HYPERLIPIDEMIA: ICD-10-CM

## 2025-07-23 DIAGNOSIS — I10 PRIMARY HYPERTENSION: Primary | ICD-10-CM

## 2025-07-23 PROCEDURE — 3077F SYST BP >= 140 MM HG: CPT | Performed by: NURSE PRACTITIONER

## 2025-07-23 PROCEDURE — 3008F BODY MASS INDEX DOCD: CPT | Performed by: NURSE PRACTITIONER

## 2025-07-23 PROCEDURE — 1159F MED LIST DOCD IN RCRD: CPT | Performed by: NURSE PRACTITIONER

## 2025-07-23 PROCEDURE — 99406 BEHAV CHNG SMOKING 3-10 MIN: CPT | Performed by: NURSE PRACTITIONER

## 2025-07-23 PROCEDURE — 99213 OFFICE O/P EST LOW 20 MIN: CPT | Performed by: NURSE PRACTITIONER

## 2025-07-23 PROCEDURE — 1160F RVW MEDS BY RX/DR IN RCRD: CPT | Performed by: NURSE PRACTITIONER

## 2025-07-23 PROCEDURE — 3079F DIAST BP 80-89 MM HG: CPT | Performed by: NURSE PRACTITIONER

## 2025-07-23 PROCEDURE — G2211 COMPLEX E/M VISIT ADD ON: HCPCS | Performed by: NURSE PRACTITIONER

## 2025-07-23 RX ORDER — ACYCLOVIR 400 MG/1
400 TABLET ORAL 2 TIMES DAILY
COMMUNITY

## 2025-08-28 DIAGNOSIS — I10 PRIMARY HYPERTENSION: ICD-10-CM

## 2025-08-28 RX ORDER — METOPROLOL SUCCINATE 25 MG/1
25 TABLET, EXTENDED RELEASE ORAL DAILY
Qty: 90 TABLET | Refills: 3 | Status: SHIPPED | OUTPATIENT
Start: 2025-08-28 | End: 2026-08-28

## 2025-10-28 ENCOUNTER — APPOINTMENT (OUTPATIENT)
Dept: PRIMARY CARE | Facility: CLINIC | Age: 75
End: 2025-10-28
Payer: MEDICARE

## 2026-07-15 ENCOUNTER — APPOINTMENT (OUTPATIENT)
Dept: CARDIOLOGY | Facility: CLINIC | Age: 76
End: 2026-07-15
Payer: MEDICARE